# Patient Record
Sex: FEMALE | Race: BLACK OR AFRICAN AMERICAN | NOT HISPANIC OR LATINO | Employment: OTHER | URBAN - METROPOLITAN AREA
[De-identification: names, ages, dates, MRNs, and addresses within clinical notes are randomized per-mention and may not be internally consistent; named-entity substitution may affect disease eponyms.]

---

## 2017-01-04 ENCOUNTER — PATIENT OUTREACH (OUTPATIENT)
Dept: ADMINISTRATIVE | Facility: HOSPITAL | Age: 57
End: 2017-01-04

## 2017-01-04 NOTE — LETTER
January 4, 2017    Osiris Coxony  [address]  [facility] [address]             [facility]  [address]  [place] [address]  Phone: [phone] Dear Mrs. Dumont:    [facility] is committed to your overall health.  To help you get the most out of each of your visits, we will review your information to make sure you are up to date on all of your recommended tests and/or procedures.      Dr. Mckeon has found that you may be due for     One-time Hepatitis C Screening lab test(a viral condition that can harm the liver)  Tetanus vaccine  Pneumonia vaccine  Pap smear  Mammogram  Colonoscopy  Diabetic foot exam  Annual dilated eye exam  Flu vaccine    If you have had any of the above done at another facility, please bring the records or information with you so that your record at [facility] will be complete.    If you are currently taking medication, please bring it with you to your appointment for review.      If you have any questions or concerns, please don't hesitate to call.    Sincerely,    Eren Thao LPN Clinical Care Coordinator  [facility] [address] [facility]  [place] [address]  [phone] (p)   [phone] (f)

## 2017-01-05 DIAGNOSIS — I10 ESSENTIAL HYPERTENSION: ICD-10-CM

## 2017-01-06 RX ORDER — VERAPAMIL HYDROCHLORIDE 240 MG/1
TABLET, FILM COATED, EXTENDED RELEASE ORAL
Qty: 30 TABLET | Refills: 0 | Status: SHIPPED | OUTPATIENT
Start: 2017-01-06

## 2017-01-18 ENCOUNTER — TELEPHONE (OUTPATIENT)
Dept: FAMILY MEDICINE | Facility: CLINIC | Age: 57
End: 2017-01-18

## 2017-01-18 NOTE — TELEPHONE ENCOUNTER
----- Message from Bakari Gonzales sent at 1/18/2017  1:10 PM CST -----  Contact: patient  Patient called stated that she can't make appt that she doesn't have transportation. appt is schedule for 1:20pm today. Thanks,

## 2017-02-08 DIAGNOSIS — E78.2 MIXED HYPERLIPIDEMIA: ICD-10-CM

## 2017-02-08 DIAGNOSIS — F51.04 CHRONIC INSOMNIA: ICD-10-CM

## 2017-02-08 RX ORDER — SIMVASTATIN 10 MG/1
TABLET, FILM COATED ORAL
Qty: 30 TABLET | Refills: 5 | Status: SHIPPED | OUTPATIENT
Start: 2017-02-08 | End: 2017-03-18 | Stop reason: SDUPTHER

## 2017-02-08 RX ORDER — ZOLPIDEM TARTRATE 5 MG/1
TABLET ORAL
Qty: 30 TABLET | Refills: 2 | Status: SHIPPED | OUTPATIENT
Start: 2017-02-08 | End: 2017-05-10 | Stop reason: SDUPTHER

## 2017-03-02 ENCOUNTER — PATIENT OUTREACH (OUTPATIENT)
Dept: ADMINISTRATIVE | Facility: HOSPITAL | Age: 57
End: 2017-03-02

## 2017-03-02 NOTE — LETTER
March 2, 2017    Osiris Dumont  [address]  [facility] [address]             [facility]  [address]  [facility] [address]  Phone: [phone] Dear Mrs. Dumont:    [facility] is committed to your overall health.  To help you get the most out of each of your visits, we will review your information to make sure you are up to date on all of your recommended tests and/or procedures.      Dr. Mckeon has found that you may be due for One time Hepatitis C screening lab test ( a viral condition that harms the liver), tetanus vaccine, pneumonia vaccine, pap smear, mammogram, colonoscopy, diabetic foot exam, annual dilated eye exam, flu vaccine, hemoglobin A1C.     If you have had any of the above done at another facility, please bring the records or information with you so that your record at [facility] will be complete.    If you are currently taking medication, please bring it with you to your appointment for review.        If you have any questions or concerns, please don't hesitate to call.    Sincerely,        Eren Thao LPN Clinical Care Coordinator  [facility] [address] [facility]  [place] [address]  [phone] (p)   [phone] (f)

## 2017-03-17 ENCOUNTER — HOSPITAL ENCOUNTER (OUTPATIENT)
Dept: RADIOLOGY | Facility: HOSPITAL | Age: 57
Discharge: HOME | End: 2017-03-17
Attending: FAMILY MEDICINE
Payer: MEDICAID

## 2017-03-17 ENCOUNTER — OFFICE VISIT (OUTPATIENT)
Dept: FAMILY MEDICINE | Facility: CLINIC | Age: 57
End: 2017-03-17
Payer: MEDICAID

## 2017-03-17 VITALS
SYSTOLIC BLOOD PRESSURE: 136 MMHG | HEIGHT: 64 IN | RESPIRATION RATE: 16 BRPM | BODY MASS INDEX: 41.33 KG/M2 | HEART RATE: 86 BPM | DIASTOLIC BLOOD PRESSURE: 76 MMHG | WEIGHT: 242.06 LBS

## 2017-03-17 DIAGNOSIS — E66.01 MORBID OBESITY, UNSPECIFIED OBESITY TYPE: ICD-10-CM

## 2017-03-17 DIAGNOSIS — E03.9 HYPOTHYROIDISM, UNSPECIFIED TYPE: ICD-10-CM

## 2017-03-17 DIAGNOSIS — E78.2 MIXED HYPERLIPIDEMIA: ICD-10-CM

## 2017-03-17 DIAGNOSIS — F51.04 CHRONIC INSOMNIA: ICD-10-CM

## 2017-03-17 DIAGNOSIS — G89.29 CHRONIC PAIN OF BOTH KNEES: ICD-10-CM

## 2017-03-17 DIAGNOSIS — M25.561 CHRONIC PAIN OF BOTH KNEES: ICD-10-CM

## 2017-03-17 DIAGNOSIS — Z79.4 TYPE 2 DIABETES MELLITUS WITHOUT COMPLICATION, WITH LONG-TERM CURRENT USE OF INSULIN: Primary | ICD-10-CM

## 2017-03-17 DIAGNOSIS — M25.562 CHRONIC PAIN OF BOTH KNEES: ICD-10-CM

## 2017-03-17 DIAGNOSIS — G47.10 HYPERSOMNIA: ICD-10-CM

## 2017-03-17 DIAGNOSIS — E11.9 TYPE 2 DIABETES MELLITUS WITHOUT COMPLICATION, WITH LONG-TERM CURRENT USE OF INSULIN: Primary | ICD-10-CM

## 2017-03-17 DIAGNOSIS — I10 ESSENTIAL HYPERTENSION: ICD-10-CM

## 2017-03-17 PROCEDURE — 99999 PR PBB SHADOW E&M-EST. PATIENT-LVL III: CPT | Mod: PBBFAC,,, | Performed by: FAMILY MEDICINE

## 2017-03-17 PROCEDURE — 99214 OFFICE O/P EST MOD 30 MIN: CPT | Mod: S$PBB,,, | Performed by: FAMILY MEDICINE

## 2017-03-17 PROCEDURE — 73562 X-RAY EXAM OF KNEE 3: CPT | Mod: 26,50,, | Performed by: RADIOLOGY

## 2017-03-17 PROCEDURE — 73562 X-RAY EXAM OF KNEE 3: CPT | Mod: TC,50,PO

## 2017-03-17 RX ORDER — HYDROCODONE BITARTRATE AND ACETAMINOPHEN 10; 325 MG/1; MG/1
1 TABLET ORAL DAILY PRN
Refills: 0 | Status: ON HOLD | COMMUNITY
Start: 2017-02-16 | End: 2017-11-15 | Stop reason: HOSPADM

## 2017-03-17 NOTE — PROGRESS NOTES
Subjective:     THIS DOCUMENT WAS MADE IN PART WITH [employer] DICTATION SOFTWARE. OCCASIONALLY THIS SOFTWARE MAY MISINTERPRET WORDS OR PHRASES.   Patient ID: Osiris Dumont is a 56 y.o. female.    Chief Complaint: Diabetes (3 month follow up ); Leg Pain (left leg ); and Chest Pain    HPI    this is a 56 year old female with mild intellectual disability. She is here with her sister who is her primary caretaker. She's also here with her daughter    T2DM, Chronic condition. Her last A1c was reasonably satisfactory. But she reports that recent readings have been elevated, can often reach 200, AM's 150-160. She denies polyuria or polydipsia. She does have a sedentary lifestyle    HTN,  Chronic condition, this appears stable. No home readings.     Hypothyroidism, she does remain on levothyroxine is that she is compliant with medication. She is due for labs     she does complain of chronic fatigue. In her chart there is a documented history of instructive sleep apnea but she doesn't know where this came from. She certainly has symptoms that are very suspicious. She doesn't remember having a sleep study. Her daughter does state she snores very loudly. Unaware of ethnic spells     just history of chronic insomnia. She states the Ambien really doesn't work that well for her anymore. She goes sleep-related doesn't wake up till two or three in the afternoon but generally sleep straight without waking up when she falls asleep     she does have chronic pain issues, osteoarthritis, as well as back pain. She has a particular hydrocodone that apparently she gets from a doctor near her residence in [facility]. I do not prescribe her narcotics.    Active Ambulatory Problems     Diagnosis Date Noted    Type 2 diabetes mellitus     Hypertension     Hyperlipidemia     Chronic insomnia     Chest pain on exertion 01/07/2013    Dyspnea on effort 01/07/2013    Hypothyroidism     Morbid obesity 03/28/2013    NANCY (obstructive sleep apnea)  "03/28/2013    Chronic bronchitis 08/16/2014    Tardive dyskinesia 08/16/2014     Resolved Ambulatory Problems     Diagnosis Date Noted    No Resolved Ambulatory Problems     Past Medical History:   Diagnosis Date    Allergy     Arthritis     Asthma     Chronic insomnia     Headache(784.0)     Hyperlipidemia     Hypertension     Hypothyroidism     Otitis media     Type 2 diabetes mellitus            Review of Systems   Constitutional: Positive for fatigue. Negative for chills, fever and unexpected weight change.   HENT: Negative for postnasal drip, sinus pressure and trouble swallowing.    Eyes: Negative for photophobia and visual disturbance.   Respiratory: Negative for cough, chest tightness, shortness of breath and wheezing.    Cardiovascular: Negative for chest pain and leg swelling.   Endocrine: Negative for polydipsia, polyphagia and polyuria.   Genitourinary: Negative for dysuria and hematuria.   Musculoskeletal: Positive for arthralgias and back pain.   Skin: Negative for rash and wound.   Neurological: Negative for speech difficulty, weakness, light-headedness and headaches.   Hematological: Negative for adenopathy.   Psychiatric/Behavioral: Positive for sleep disturbance. Negative for agitation, dysphoric mood and suicidal ideas. The patient is nervous/anxious.        Objective:       Vitals:    03/17/17 1342 03/17/17 1357   BP: (!) 148/80 136/76   Pulse: 86    Resp: 16    Weight: 109.8 kg (242 lb 1 oz)    Height: 5' 4" (1.626 m)        Physical Exam   Constitutional: No distress.   Appearance is consistent with calculated BMI:  Body mass index is 41.55 kg/(m^2).     HENT:   Head: Normocephalic and atraumatic.   Eyes: Conjunctivae are normal. Right eye exhibits no discharge. Left eye exhibits no discharge. No scleral icterus.   Cardiovascular: Normal rate and regular rhythm.    No murmur heard.  Pulses:       Dorsalis pedis pulses are 2+ on the right side, and 2+ on the left side.        " Posterior tibial pulses are 2+ on the right side, and 2+ on the left side.   Pulmonary/Chest: Effort normal and breath sounds normal. No respiratory distress. She has no wheezes.   Musculoskeletal:        Right foot: There is no deformity.        Left foot: There is no deformity.   She has mild valgus is. There is no redness or warmth.   Feet:   Right Foot:   Protective Sensation: 10 sites tested. 10 sites sensed.   Skin Integrity: Positive for callus and dry skin. Negative for ulcer.   Left Foot:   Protective Sensation: 10 sites tested. 10 sites sensed.   Skin Integrity: Positive for callus and dry skin. Negative for ulcer.   Skin: She is not diaphoretic.       Knees varus deformituy  Very dry feet  Assessment:       1. Type 2 diabetes mellitus without complication, with long-term current use of insulin    2. Essential hypertension    3. Mixed hyperlipidemia    4. Chronic insomnia    5. Hypothyroidism, unspecified type    6. Morbid obesity, unspecified obesity type    7. Hypersomnia    8. Chronic pain of both knees        Plan:       Osiris was seen today for diabetes, leg pain and chest pain.    Diagnoses and all orders for this visit:    Type 2 diabetes mellitus without complication, with long-term current use of insulin  -     TSH; Future  -     Hemoglobin A1c; Future  -     Microalbumin/creatinine urine ratio; Future    Essential hypertension  -     Comprehensive metabolic panel; Future  -     TSH; Future    Mixed hyperlipidemia  -     Lipid panel; Future    Chronic insomnia   I recommend improving sleep habits. I don't recommend increasing Ambien or adding any other agents at this point she needs to have evaluation for sleep apnea first    Hypothyroidism, unspecified type  -     TSH; Future    Morbid obesity, unspecified obesity type   discussed a healthy diet exercise and weight loss    Hypersomnia   multiple risk factors and symptoms worrisome for sleep apnea. I have given her a written order for sleep  consultation. She will bring this to a clinic near her home.    Chronic pain of both knees  -     X-ray Knee Ortho Bilateral; Future  -     Ambulatory referral to Orthopedics     She should follow every six months sooner if needed

## 2017-03-17 NOTE — MR AVS SNAPSHOT
[facility]  [address]  [place] [address]  Phone: [phone]  Fax: [phone]                  Osiris Dumont   3/17/2017 1:40 PM   Office Visit    Description:  Female : 1960   Provider:  Galen Mckeon MD   Department:  [facility]           Reason for Visit     Diabetes     Leg Pain     Chest Pain           Diagnoses this Visit        Comments    Type 2 diabetes mellitus without complication, with long-term current use of insulin    -  Primary     Essential hypertension         Mixed hyperlipidemia         Chronic insomnia         Hypothyroidism, unspecified type         Morbid obesity, unspecified obesity type         Hypersomnia         Chronic pain of both knees                To Do List           Future Appointments        Provider Department Dept Phone    3/17/2017 2:50 PM LAB, [place] [facility] [phone]    3/17/2017 3:15 PM [facility] XR3 [facility] [phone]    3/20/2017 11:15 AM Ayaz Wilkes MD [facility] [phone]    2017 1:20 PM Galen Mckeon MD [facility] [phone]      Goals (5 Years of Data)     None      [facility] On Call     [facility] Nurse Care Line - 24/7 Assistance  Registered nurses in the [facility] provide clinical advisement, health education, appointment booking, and other advisory services.  Call for this free service at [phone].             Medications           Message regarding Medications     Verify the changes and/or additions to your medication regime listed below are the same as discussed with your clinician today.  If any of these changes or additions are incorrect, please notify your healthcare provider.             Verify that the below list of medications is an accurate representation of the medications you are currently taking.  If none reported, the list may be blank. If incorrect, please  "contact your healthcare provider. Carry this list with you in case of emergency.           Current Medications     ACCU-CHEK SMARTVIEW TEST STRIP Strp TEST 3 TIMES DAILY BEFORE MEALS    furosemide (LASIX) 40 MG tablet Take 1 tablet (40 mg total) by mouth 2 (two) times daily.    hydrocodone-acetaminophen 10-325mg (NORCO)  mg Tab Take 1 tablet by mouth daily as needed.    insulin glargine (LANTUS SOLOSTAR) 100 unit/mL (3 mL) InPn pen Inject 10 Units into the skin once daily.    insulin needles, disposable, (BD INSULIN PEN NEEDLE UF SHORT) 31 X 5/16 " Ndle Use daily with Lantus solostar pen    lancets (ACCU-CHEK FASTCLIX) [facility] USE TO TEST BLOOD SUGAR AS DIRECTED    levothyroxine (SYNTHROID) 25 MCG tablet Take 1 tablet (25 mcg total) by mouth once daily.    metformin (GLUCOPHAGE) 500 MG tablet Take 1 tablet (500 mg total) by mouth 2 (two) times daily with meals.    metoprolol tartrate (LOPRESSOR) 50 MG tablet Take 1 tablet (50 mg total) by mouth once daily.    omeprazole (PRILOSEC) 20 MG capsule Take 1 capsule (20 mg total) by mouth once daily.    PROAIR HFA 90 mcg/actuation inhaler Inhale 2 puffs into the lungs every 6 (six) hours as needed.    risperidone (RISPERDAL M-TABS) 1 MG TbDL Take 1 tablet (1 mg total) by mouth once daily.    sertraline (ZOLOFT) 100 MG tablet Take 1 tablet (100 mg total) by mouth once daily.    simvastatin (ZOCOR) 10 MG tablet Take 1 tablet (10 mg total) by mouth every evening.    sucralfate (CARAFATE) 1 gram tablet Take 1 tablet (1 g total) by mouth 4 (four) times daily before meals and nightly.    verapamil (CALAN-SR) 240 MG CR tablet Take 1 tablet (240 mg total) by mouth every evening.    zolpidem (AMBIEN) 5 MG Tab Take 1 tablet (5 mg total) by mouth nightly.    valsartan (DIOVAN) 80 MG tablet Take 1 tablet (80 mg total) by mouth once daily.           Clinical Reference Information           Your Vitals Were     BP Pulse Resp Height Weight BMI    136/76 86 16 5' 4" (1.626 m) 109.8 kg " (242 lb 1 oz) 41.55 kg/m2      Blood Pressure          Most Recent Value    BP  136/76      Allergies as of 3/17/2017     No Known Allergies      Immunizations Administered on Date of Encounter - 3/17/2017     None      Orders Placed During Today's Visit      Normal Orders This Visit    Ambulatory referral to Orthopedics     Future Labs/Procedures Expected by Expires    Comprehensive metabolic panel  3/17/2017 3/18/2018    Hemoglobin A1c  3/17/2017 3/18/2018    Lipid panel  3/17/2017 3/18/2018    Microalbumin/creatinine urine ratio  3/17/2017 3/18/2018    TSH  3/17/2017 3/18/2018    X-ray Knee Ortho Bilateral  3/17/2017 3/17/2018      Language Assistance Services     ATTENTION: Language assistance services are available, free of charge. Please call [phone].      ATENCIÓN: Si marko avendanomane, tiene a lawson disposición servicios gratuitos de asistencia lingüística. Llame al [phone].     CHÚ Ý: N?u b?n nói Ti?ng Vi?t, có các d?ch v? h? tr? ngôn ng? mi?n phí dành cho b?n. G?i s? [phone].         [facility] complies with applicable Federal civil rights laws and does not discriminate on the basis of race, color, national origin, age, disability, or sex.

## 2017-03-18 ENCOUNTER — TELEPHONE (OUTPATIENT)
Dept: FAMILY MEDICINE | Facility: CLINIC | Age: 57
End: 2017-03-18

## 2017-03-18 DIAGNOSIS — E78.2 MIXED HYPERLIPIDEMIA: ICD-10-CM

## 2017-03-18 RX ORDER — SIMVASTATIN 20 MG/1
20 TABLET, FILM COATED ORAL NIGHTLY
Qty: 30 TABLET | Refills: 11 | Status: SHIPPED | OUTPATIENT
Start: 2017-03-18 | End: 2017-06-13

## 2017-03-18 NOTE — TELEPHONE ENCOUNTER
Please call with lab results. Please speak with her sister to confirm. You can report the results to the patient but  patient has intellectual disability in may not fully understand or remember     diabetes is not that bad. There is room for improvement but all things considered A1c of 7.3 is reasonable. I'd like her to increase exercise and cut back on her calories and carbohydrates.     Cholesterol is higher. I want to increase the dosage of her simvastatin from 10 to 20 mg. I did send in a new prescription     she should follow back in six months as originally planned

## 2017-03-21 ENCOUNTER — TELEPHONE (OUTPATIENT)
Dept: ORTHOPEDICS | Facility: CLINIC | Age: 57
End: 2017-03-21

## 2017-03-21 NOTE — TELEPHONE ENCOUNTER
----- Message from Naif Troy sent at 3/21/2017  3:24 PM CDT -----  Contact: self/home   Pt would like to reschedule her appt that she missed on 3/20.

## 2017-03-23 ENCOUNTER — TELEPHONE (OUTPATIENT)
Dept: FAMILY MEDICINE | Facility: CLINIC | Age: 57
End: 2017-03-23

## 2017-03-23 NOTE — TELEPHONE ENCOUNTER
----- Message from RT Broderick sent at 3/23/2017  4:08 PM CDT -----  Contact: pt    pt , requesting a call back concerning a letter she received in the mail concerning her X-ray, thanks.

## 2017-03-24 ENCOUNTER — TELEPHONE (OUTPATIENT)
Dept: FAMILY MEDICINE | Facility: CLINIC | Age: 57
End: 2017-03-24

## 2017-03-24 NOTE — TELEPHONE ENCOUNTER
----- Message from Anna Giordano sent at 3/24/2017 12:13 PM CDT -----  Abigail Rodriguez is returning nurses call regarding the above patient contact her at [phone].    Thank you

## 2017-03-24 NOTE — TELEPHONE ENCOUNTER
Notified daughter that there is room for improvement in her blood sugar results. Dr dillard recommends increasing exercise cutting back on calories and carbohydrates.  She also understands pt cholesterol is higher and he sent in an increased strength of her  Simvastatin for her to take she is to follow up in 6 months.

## 2017-03-24 NOTE — TELEPHONE ENCOUNTER
Spoke with pt she wanted to make sure she understands to stop simvastatin 10 mg once she gets the 20 mgs at the drugstore she understands

## 2017-03-24 NOTE — TELEPHONE ENCOUNTER
----- Message from Daniel Mahmood sent at 3/24/2017 11:29 AM CDT -----  Contact: Sister Abigail Rodriguez  Called to get information from sister's x-ray. Her sister was yesterday, but did not understand the results. Call back [phone]. Thank you!

## 2017-03-29 ENCOUNTER — TELEPHONE (OUTPATIENT)
Dept: FAMILY MEDICINE | Facility: CLINIC | Age: 57
End: 2017-03-29

## 2017-03-29 NOTE — TELEPHONE ENCOUNTER
I already addressed the labs in a phone note I think the day or the day after they were done.  Please look back    The x-ray shows arthritis changes.  It was my impression she was going to follow with orthopedics.  Another option would be physical therapy.  But she has arthritis in both knees.

## 2017-03-29 NOTE — TELEPHONE ENCOUNTER
----- Message from Anna Giordano sent at 3/28/2017  3:42 PM CDT -----  Patient received xray result and would like a call back with an explaination, patient is also requesting lab results, contact patient at [phone].       Thank you

## 2017-04-06 ENCOUNTER — HOSPITAL ENCOUNTER (OUTPATIENT)
Dept: RADIOLOGY | Facility: HOSPITAL | Age: 57
Discharge: HOME | End: 2017-04-06
Attending: ORTHOPAEDIC SURGERY
Payer: MEDICAID

## 2017-04-06 ENCOUNTER — OFFICE VISIT (OUTPATIENT)
Dept: ORTHOPEDICS | Facility: CLINIC | Age: 57
End: 2017-04-06
Payer: MEDICAID

## 2017-04-06 VITALS — BODY MASS INDEX: 41.32 KG/M2 | HEIGHT: 64 IN | WEIGHT: 242 LBS

## 2017-04-06 DIAGNOSIS — E66.01 MORBID OBESITY, UNSPECIFIED OBESITY TYPE: ICD-10-CM

## 2017-04-06 DIAGNOSIS — M25.562 PAIN IN BOTH KNEES, UNSPECIFIED CHRONICITY: ICD-10-CM

## 2017-04-06 DIAGNOSIS — M25.552 LEFT HIP PAIN: ICD-10-CM

## 2017-04-06 DIAGNOSIS — M25.561 PAIN IN BOTH KNEES, UNSPECIFIED CHRONICITY: ICD-10-CM

## 2017-04-06 DIAGNOSIS — M17.0 PRIMARY OSTEOARTHRITIS OF BOTH KNEES: Primary | ICD-10-CM

## 2017-04-06 PROCEDURE — 73502 X-RAY EXAM HIP UNI 2-3 VIEWS: CPT | Mod: TC,PO,LT

## 2017-04-06 PROCEDURE — 99999 PR PBB SHADOW E&M-EST. PATIENT-LVL II: CPT | Mod: PBBFAC,,, | Performed by: ORTHOPAEDIC SURGERY

## 2017-04-06 PROCEDURE — 99203 OFFICE O/P NEW LOW 30 MIN: CPT | Mod: S$PBB,,, | Performed by: ORTHOPAEDIC SURGERY

## 2017-04-06 PROCEDURE — 73502 X-RAY EXAM HIP UNI 2-3 VIEWS: CPT | Mod: 26,LT,, | Performed by: RADIOLOGY

## 2017-04-06 NOTE — LETTER
April 6, 2017      Galen Mckeon MD  [address] [place] [address]           [facility]  [address] [place] [address]  Phone: [phone]          Patient: Osiris Dumont   MR Number: [id number]   YOB: 1960   Date of Visit: 4/6/2017       Dear Dr. Galen Mckeon:    Thank you for referring Osiris Dumont to me for evaluation. Attached you will find relevant portions of my assessment and plan of care.    If you have questions, please do not hesitate to call me. I look forward to following Osiris Dumont along with you.    Sincerely,    Ayaz Wilkes MD    Enclosure  CC:  No Recipients    If you would like to receive this communication electronically, please contact [email or web address] or [phone] to request more information on [employer] Link access.    For providers and/or their staff who would like to refer a patient to [facility], please contact us through our one-stop-shop provider referral line, [facility] , at [phone].    If you feel you have received this communication in error or would no longer like to receive these types of communications, please e-mail [email or web address]

## 2017-04-06 NOTE — PROGRESS NOTES
Osiris Dumont, seen as a consult from Dr. Mckeon, complaining in her left   worse than right knee pain, up to 10/10 on the pain scale.  It has gotten   progressively worse for about two years' time.  Walks with a limp, painful.    Complaints of falling and giving way.    Exam today shows that knee range of motion is full and painless.  Minimally   tender at the joint line.  Hip range of motion on the left side is limited and   reproduces her symptoms.  She walks with a Trendelenburg-type gait.    X-rays of the knee, they all look pretty good.    ASSESSMENT:  Probable early hip arthrosis.    PLAN:  We will get her hip x-ray, and we will see her back to followup with   result.      PBB/PN  dd: 04/06/2017 14:44:07 (CDT)  td: 04/07/2017 00:59:38 (CDT)  Doc ID   #[id number]  Job ID #[id number]    CC:     Further History  Aching pain  Worse with activity  Relieved with rest  No other associated symptoms  No other radiation    Further Exam  Alert and oriented  Pleasant  Contralateral limb has appropriate range of motion for age and condition  Contralateral limb has appropriate strength for age and condition  Contralateral limb has appropriate stability  for age and condition  No adenopathy  Pulses are appropriate for current condition  Skin is intact        Chief Complaint    Chief Complaint   Patient presents with    Knee Pain     bilat,left primary       HPI  Osiris Dumont is a 57 y.o.  female who presents with       Past Medical History  Past Medical History:   Diagnosis Date    Allergy     Arthritis     Asthma     Chronic insomnia     Headache     Hyperlipidemia     Hypertension     Hypothyroidism     Otitis media     Type 2 diabetes mellitus        Past Surgical History  Past Surgical History:   Procedure Laterality Date    CHOLECYSTECTOMY      FOOT SURGERY      benign tumor    HYSTERECTOMY      not sure if complete       Medications  Current Outpatient Prescriptions   Medication Sig    ACCU-CHEK  "SMARTVIEW TEST STRIP Strp TEST 3 TIMES DAILY BEFORE MEALS    furosemide (LASIX) 40 MG tablet Take 1 tablet (40 mg total) by mouth 2 (two) times daily.    hydrocodone-acetaminophen 10-325mg (NORCO)  mg Tab Take 1 tablet by mouth daily as needed.    insulin glargine (LANTUS SOLOSTAR) 100 unit/mL (3 mL) InPn pen Inject 10 Units into the skin once daily.    insulin needles, disposable, (BD INSULIN PEN NEEDLE UF SHORT) 31 X 5/16 " Ndle Use daily with Lantus solostar pen    lancets (ACCU-CHEK FASTCLIX) [facility] USE TO TEST BLOOD SUGAR AS DIRECTED    levothyroxine (SYNTHROID) 25 MCG tablet Take 1 tablet (25 mcg total) by mouth once daily.    metformin (GLUCOPHAGE) 500 MG tablet Take 1 tablet (500 mg total) by mouth 2 (two) times daily with meals.    metoprolol tartrate (LOPRESSOR) 50 MG tablet Take 1 tablet (50 mg total) by mouth once daily.    omeprazole (PRILOSEC) 20 MG capsule Take 1 capsule (20 mg total) by mouth once daily.    PROAIR HFA 90 mcg/actuation inhaler Inhale 2 puffs into the lungs every 6 (six) hours as needed.    risperidone (RISPERDAL M-TABS) 1 MG TbDL Take 1 tablet (1 mg total) by mouth once daily.    sertraline (ZOLOFT) 100 MG tablet Take 1 tablet (100 mg total) by mouth once daily.    simvastatin (ZOCOR) 20 MG tablet Take 1 tablet (20 mg total) by mouth every evening.    sucralfate (CARAFATE) 1 gram tablet Take 1 tablet (1 g total) by mouth 4 (four) times daily before meals and nightly.    valsartan (DIOVAN) 80 MG tablet Take 1 tablet (80 mg total) by mouth once daily.    verapamil (CALAN-SR) 240 MG CR tablet Take 1 tablet (240 mg total) by mouth every evening.    zolpidem (AMBIEN) 5 MG Tab Take 1 tablet (5 mg total) by mouth nightly.     No current facility-administered medications for this visit.        Allergies  Review of patient's allergies indicates:  No Known Allergies    Family History  Family History   Problem Relation Age of Onset    Diabetes Mother     Stroke Mother     " Stroke      Breast cancer      Stroke Sister     Amblyopia Neg Hx     Blindness Neg Hx     Cancer Neg Hx     Cataracts Neg Hx     Glaucoma Neg Hx     Hypertension Neg Hx     Macular degeneration Neg Hx     Retinal detachment Neg Hx     Strabismus Neg Hx     Thyroid disease Neg Hx        Social History  Social History     Social History    Marital status:      Spouse name: N/A    Number of children: N/A    Years of education: N/A     Occupational History    Not on file.     Social History Main Topics    Smoking status: Never Smoker    Smokeless tobacco: Never Used    Alcohol use No    Drug use: No    Sexual activity: Not on file     Other Topics Concern    Not on file     Social History Narrative               Review of Systems     Constitutional: Negative    HENT: Negative  Eyes: Negative  Respiratory: Negative  Cardiovascular: Negative  Musculoskeletal: HPI  Skin: Negative  Neurological: Negative  Hematological: Negative  Endocrine: Negative                 Physical Exam    There were no vitals filed for this visit.  Body mass index is 41.54 kg/(m^2).  Physical Examination:     General appearance -  well appearing, and in no distress  Mental status - awake  Neck - supple  Chest -  symmetric air entry  Heart - normal rate   Abdomen - soft      Assessment     1. Primary osteoarthritis of both knees    2. Pain in both knees, unspecified chronicity    3. Morbid obesity, unspecified obesity type    4. Left hip pain          Plan

## 2017-04-10 RX ORDER — METFORMIN HYDROCHLORIDE 500 MG/1
TABLET ORAL
Qty: 60 TABLET | OUTPATIENT
Start: 2017-04-10

## 2017-04-19 ENCOUNTER — TELEPHONE (OUTPATIENT)
Dept: FAMILY MEDICINE | Facility: CLINIC | Age: 57
End: 2017-04-19

## 2017-04-19 RX ORDER — TRAMADOL HYDROCHLORIDE 50 MG/1
50 TABLET ORAL EVERY 6 HOURS PRN
Qty: 60 TABLET | Refills: 1 | Status: ON HOLD | OUTPATIENT
Start: 2017-04-19 | End: 2017-11-15 | Stop reason: HOSPADM

## 2017-04-19 NOTE — TELEPHONE ENCOUNTER
Informed pt of Dr Mckeon message regarding narcotic. Pt verbalized understanding and is ok with rx for tramadol.

## 2017-04-19 NOTE — TELEPHONE ENCOUNTER
----- Message from Bakari Gonzales sent at 4/19/2017 11:12 AM CDT -----  Contact: Patient  Patient called to advise that she took a hip xray, and she need a hip replacement. Patient requesting script for Lortabs. Send to [facility] pharmacy [place], [place]. Please call back at [phone]. Thanks,

## 2017-04-19 NOTE — TELEPHONE ENCOUNTER
I do not prescribe narcotics for chronic arthritis pain.  She had been on Lortab before but was getting this from a different physician, somebody near her home.  I also don't think that orthopedics here we'll prescribe Lortab.    I am willing to prescribe something called tramadol if she likes but I don't prescribe chronic narcotics.    Let me know if she wants the tramadol.

## 2017-04-19 NOTE — TELEPHONE ENCOUNTER
This patient called today, was seen by Dr Wilkes for hip pain, x ray shows the need for hip replacement. Patient is requesting Rx for pain medication for hip pain. Should this request be sent to Dr Wilkes or are you able to fill for patient? Thank you.

## 2017-04-19 NOTE — TELEPHONE ENCOUNTER
----- Message from Julien STINSON Kimber sent at 4/19/2017  1:37 PM CDT -----  Contact: same  Patient called in and stated that she was checking on her pain medication refill but then said someone call her and the Rx they called in she cannot take and it has to be hydrocodone.      [facility] - [address] [address]  Phone: [phone] Fax: [phone]    Patient call back number is [phone]

## 2017-04-20 ENCOUNTER — TELEPHONE (OUTPATIENT)
Dept: FAMILY MEDICINE | Facility: CLINIC | Age: 57
End: 2017-04-20

## 2017-04-20 RX ORDER — TRAZODONE HYDROCHLORIDE 50 MG/1
TABLET ORAL
Qty: 30 TABLET | Refills: 3 | Status: SHIPPED | OUTPATIENT
Start: 2017-04-20 | End: 2017-10-19

## 2017-04-20 NOTE — TELEPHONE ENCOUNTER
----- Message from Jesika Feldman sent at 4/19/2017  3:50 PM CDT -----  Contact: Patient  Patient needs refill on Trazodone sent [facility]. Any questions call patient [phone].

## 2017-04-20 NOTE — TELEPHONE ENCOUNTER
----- Message from Sarah Garnica sent at 4/20/2017  9:15 AM CDT -----  Contact: Patience  Patient states that she is returning the call about two prescriptions.  One for pain and one for sleeping.  Can you please call the patient back at  [phone].  Thank you

## 2017-04-21 DIAGNOSIS — M25.552 LEFT HIP PAIN: Primary | ICD-10-CM

## 2017-04-24 ENCOUNTER — HOSPITAL ENCOUNTER (OUTPATIENT)
Dept: RADIOLOGY | Facility: HOSPITAL | Age: 57
Discharge: HOME | End: 2017-04-24
Attending: ORTHOPAEDIC SURGERY
Payer: MEDICAID

## 2017-04-24 ENCOUNTER — OFFICE VISIT (OUTPATIENT)
Dept: ORTHOPEDICS | Facility: CLINIC | Age: 57
End: 2017-04-24
Payer: MEDICAID

## 2017-04-24 VITALS — WEIGHT: 242 LBS | BODY MASS INDEX: 41.32 KG/M2 | HEIGHT: 64 IN

## 2017-04-24 DIAGNOSIS — M25.552 LEFT HIP PAIN: ICD-10-CM

## 2017-04-24 DIAGNOSIS — M87.00 AVN (AVASCULAR NECROSIS OF BONE): ICD-10-CM

## 2017-04-24 DIAGNOSIS — M25.552 LEFT HIP PAIN: Primary | ICD-10-CM

## 2017-04-24 PROCEDURE — 99999 PR PBB SHADOW E&M-EST. PATIENT-LVL II: CPT | Mod: PBBFAC,,, | Performed by: ORTHOPAEDIC SURGERY

## 2017-04-24 PROCEDURE — 99213 OFFICE O/P EST LOW 20 MIN: CPT | Mod: S$PBB,,, | Performed by: ORTHOPAEDIC SURGERY

## 2017-04-24 PROCEDURE — 99212 OFFICE O/P EST SF 10 MIN: CPT | Mod: PBBFAC,PO | Performed by: ORTHOPAEDIC SURGERY

## 2017-04-24 NOTE — PROGRESS NOTES
Osiris Dumont, 57 years old, followup.  Hip x-rays revealed her pathology is   indeed coming from her hip.  At this point, her medical comorbidities and her   size I do not feel confident in proceeding with the hip arthroplasty.  We will   try conservative approach with weight loss and exercises.  We can check her back   after several months for this to see if she has improved as a surgical   candidate.      SAURABH/MICHOACANO  dd: 04/24/2017 11:32:34 (CDT)  td: 04/24/2017 21:06:18 (CDT)  Doc ID   #[id number]  Job ID #[id number]    CC:

## 2017-05-08 PROBLEM — M16.12 PRIMARY OSTEOARTHRITIS OF LEFT HIP: Status: ACTIVE | Noted: 2017-05-08

## 2017-05-10 DIAGNOSIS — F51.04 CHRONIC INSOMNIA: ICD-10-CM

## 2017-05-10 RX ORDER — ZOLPIDEM TARTRATE 5 MG/1
TABLET ORAL
Qty: 30 TABLET | Refills: 2 | Status: SHIPPED | OUTPATIENT
Start: 2017-05-10 | End: 2018-11-23

## 2017-05-17 ENCOUNTER — PATIENT OUTREACH (OUTPATIENT)
Dept: ADMINISTRATIVE | Facility: HOSPITAL | Age: 57
End: 2017-05-17

## 2017-05-17 NOTE — PROGRESS NOTES
Overdue eye exam report, due your annual diabetic eye exam, colon cancer screening, mammogram, hepatitis   C screening, and possibly pneumonia and tetanus immunizations

## 2017-05-17 NOTE — LETTER
May 17, 2017    Osiris Dumont  [address]  [facility] [address]             [facility]  [address]  [facility] [address]  Phone: [phone] Dear Mrs. Dumont:    [facility] is committed to your overall health.  To help you get the most out of each of your visits, we will review your information to make sure you are up to date on all of your recommended tests and/or procedures.      We have Dr. Galen Mckeon listed as your primary care provider.  He has found that you may be due for your annual diabetic eye exam, colon cancer screening, mammogram, hepatitis C screening, and possibly pneumonia and tetanus immunizations.     If you have had any of the above done at an outside facility, please let us know so I can update your record.  If you have a copy of these records, please provide a copy for us to scan into your chart.  If not, please provide that provider/facilities contact information so that we may obtain copies from that facility.     Otherwise, please schedule these appointments at your earliest convenience.  You are due for your diabetic follow up with Dr. Mckeon in September of 2017.    If you have any questions or concerns, please don't hesitate to call.    Thank you for letting us care for you,  Marce Sandoval LPN Clinical Care Coordinator  [facility] [place]  [phone]

## 2017-07-07 DIAGNOSIS — F51.04 CHRONIC INSOMNIA: ICD-10-CM

## 2017-07-09 RX ORDER — ZOLPIDEM TARTRATE 5 MG/1
TABLET ORAL
Qty: 30 TABLET | OUTPATIENT
Start: 2017-07-09

## 2017-08-18 DIAGNOSIS — Z12.11 COLON CANCER SCREENING: ICD-10-CM

## 2017-09-05 ENCOUNTER — PATIENT OUTREACH (OUTPATIENT)
Dept: ADMINISTRATIVE | Facility: HOSPITAL | Age: 57
End: 2017-09-05

## 2017-09-05 NOTE — LETTER
September 5, 2017    Osiris Dumont  [address]  [facility] [address]             [facility]  [address]  [facility] [address]  Phone: [phone] Dear Mrs. Dumont:    [facility] committed to your overall health.  To help you get the most out of each of your visits, we will review your information to make sure you are up to date on all of your recommended tests and/or procedures.      Dr. Galen Mckeon has found that you may be due for a urine test for your kidneys, your annual diabetic eye exam, hepatitis C screening, mammogram, colon cancer screening, and possibly tetanus and pneumonia immunziations.     Medicare does not cover all immunizations to be given in the clinic.  Check your benefits to ensure that you do not need to receive your immunizations at the pharmacy.    If you have had any of the above done at another facility, please bring the records or information with you so that your record at [facility] will be complete.  If you would like to schedule any of these, please contact me.    If you are currently taking medication, please bring it with you to your appointment for review.    If you have any questions or concerns, please don't hesitate to call.    Thank you for letting us care for you,  Marce Sandoval LPN Clinical Care Coordinator  [facility] [place] [place]  [phone]

## 2017-09-06 RX ORDER — TRAZODONE HYDROCHLORIDE 50 MG/1
TABLET ORAL
Qty: 30 TABLET | OUTPATIENT
Start: 2017-09-06

## 2017-09-29 DIAGNOSIS — E11.9 TYPE 2 DIABETES MELLITUS WITHOUT COMPLICATION: ICD-10-CM

## 2017-12-12 ENCOUNTER — PATIENT OUTREACH (OUTPATIENT)
Dept: ADMINISTRATIVE | Facility: HOSPITAL | Age: 57
End: 2017-12-12

## 2017-12-12 DIAGNOSIS — E11.00 TYPE 2 DIABETES MELLITUS WITH HYPEROSMOLARITY WITHOUT COMA, UNSPECIFIED LONG TERM INSULIN USE STATUS: Primary | ICD-10-CM

## 2017-12-12 NOTE — PROGRESS NOTES
Pre-visit Health Maintenance Review    Patient due for diabetic eye exam.  Called to schedule retinal camera appointment in [place] if not already done outside of [facility].    Upcoming Primary Care visit: Mike 12/27/17 9:40

## 2017-12-12 NOTE — LETTER
December 12, 2017    Osiris Dumont  [address]  [place] [address]             [facility]  [address]  [facility] [address]  Phone: [phone] Dear Mrs. Dumont:    [facility] is committed to your overall health.  To help you get the most out of each of your visits, we will review your information to make sure you are up to date on all of your recommended tests and/or procedures.      Dr. Galen Mckeon has found that your chart shows you may be due for a urine test for your kidneys, your annual diabetic eye exam, hepatitis C screening, mammogram, colon cancer screening, and possibly tetanus and pneumonia immunizations.     Medicare does not cover all immunizations to be given in the clinic.  Check your benefits to ensure that you do not need to receive your immunizations at the pharmacy.    If you have not had an eye exam in the past year, we now have a  Retinal Camera at the [facility].  If we do this exam the same day you see a member of your Primary Care team, we can save you from having to pay a second co pay.      If you have had any of the above done at another facility, please bring the records or information with you so that your record at [facility] will be complete.  If you would like to schedule any of these, please contact me.    If you are currently taking medication, please bring it with you to your appointment for review.    If you have any questions or concerns, please don't hesitate to call.    Thank you for letting us care for you,  Marce Sandoval LPN Clinical Care Coordinator  [facility] [place] [facility] [place]  [phone]

## 2018-02-26 ENCOUNTER — TELEPHONE (OUTPATIENT)
Dept: FAMILY MEDICINE | Facility: CLINIC | Age: 58
End: 2018-02-26

## 2018-02-26 NOTE — TELEPHONE ENCOUNTER
Advised pt that there was nothing open sooner than her schd appt date. Pt verbalized understanding then stated that the appt time and date wont work.pt wants to be seen by provider only. Please look at [facility] and advise.

## 2018-02-26 NOTE — TELEPHONE ENCOUNTER
----- Message from Ruth Ann Stevens sent at 2/26/2018  4:57 PM CST -----  Contact: self  Patient needs to reschedule appt to sooner date. Epic not allowing me to see any other dates. Call back [phone]

## 2018-02-26 NOTE — TELEPHONE ENCOUNTER
----- Message from Daniel Mahmood sent at 2/26/2018  3:22 PM CST -----  Contact: Osiris  Calling to see if she can be seen before her scheduled appointment. She had a hip surgery and she has been falling. She only wants to see Dr. Mckeon. Please call [phone] (home)   thanks

## 2018-02-27 NOTE — TELEPHONE ENCOUNTER
I'm not sure what she's coming in for but there was a 3 o'clock open on Wednesday. I have temporarily held the spot if she can make it okay to put her there. If not let me know and I'll open it back up

## 2018-03-21 ENCOUNTER — PATIENT OUTREACH (OUTPATIENT)
Dept: ADMINISTRATIVE | Facility: HOSPITAL | Age: 58
End: 2018-03-21

## 2018-03-21 NOTE — LETTER
March 21, 2018    Osiris Dumont  [address]  [facility] [address]             [facility]  [address]  [facility] [address]  Phone: [phone] Dear Mrs. Dumont:    [facility] to your overall [facility].  To help you get the most out of each of your visits, we will review your information to make sure you are up to date on all of your recommended tests and/or procedures.      We have Dr. Galen Mckeon listed as your primary care provider.  You have not seen him in the office since March of 2017.  If Dr. Mckeon is no longer your primary care provider, please contact me so that we may update our records accordingly.      He has found that your chart shows you may be due for an office visit with him, your fasting cholesterol labs, your annual diabetic eye and foot exams, a urine test for your kidneys, hepatitis C screening, mammogram, colon cancer screening, and possibly a tetanus immunization.     If you have not had an eye exam in the past year, we now have a  Retinal Camera at the [facility].  If we do this exam the same day you see a member of your Primary Care team, we can save you from having to pay a second co pay.      Medicare does not cover all immunizations to be given in the clinic.  Check your benefits to ensure that you do not need to receive your immunizations at the pharmacy.    If you have had any of the above done at an outside facility, please let us know so I can update your record.  If you have a copy of these records, please provide a copy for us to scan into your chart.  If not, please provide that provider/facilities contact information so that we may obtain copies from that facility.     Otherwise, please schedule these appointments at your earliest convenience.    If you have any questions or concerns, please don't hesitate to call.    Thank you for letting us care for you,  Marce Sandoval LPN Clinical Care Coordinator  [facility]  [place] and [place]  [phone]

## 2018-03-21 NOTE — PROGRESS NOTES
DM registry, over 1 year.  Called pt to inform:    Due for an office visit with him, your fasting cholesterol labs, your annual diabetic eye and foot exams, a urine test for your kidneys, hepatitis C screening, mammogram, colon cancer screening, and possibly a tetanus immunization

## 2018-05-08 ENCOUNTER — TELEPHONE (OUTPATIENT)
Dept: FAMILY MEDICINE | Facility: CLINIC | Age: 58
End: 2018-05-08

## 2018-05-08 NOTE — TELEPHONE ENCOUNTER
"----- Message from Ally Kendall sent at 5/8/2018 11:21 AM CDT -----  Contact: self  Patient [phone] is calling to schedule an appt with Dr Galen Mckeon only/she does not want to see anyone else/she has hemorrhoids and wants a physical/her last visit with Dr Mckeon was 03 17 17 and she has also missed several appts/I do not show any available appts with Dr Mckeon for this year/I did try to advise patient of this and also tried to schedule with a nurse practitioner and she said "No - I want to see Dr Mckeon/please advise  "

## 2018-05-11 ENCOUNTER — OFFICE VISIT (OUTPATIENT)
Dept: FAMILY MEDICINE | Facility: CLINIC | Age: 58
End: 2018-05-11
Payer: MEDICAID

## 2018-05-11 ENCOUNTER — LAB VISIT (OUTPATIENT)
Dept: LAB | Facility: HOSPITAL | Age: 58
End: 2018-05-11
Attending: FAMILY MEDICINE
Payer: MEDICAID

## 2018-05-11 VITALS
BODY MASS INDEX: 40.07 KG/M2 | SYSTOLIC BLOOD PRESSURE: 142 MMHG | WEIGHT: 234.69 LBS | HEART RATE: 78 BPM | HEIGHT: 64 IN | OXYGEN SATURATION: 92 % | DIASTOLIC BLOOD PRESSURE: 84 MMHG | TEMPERATURE: 99 F

## 2018-05-11 DIAGNOSIS — Z79.4 TYPE 2 DIABETES MELLITUS WITHOUT COMPLICATION, WITH LONG-TERM CURRENT USE OF INSULIN: ICD-10-CM

## 2018-05-11 DIAGNOSIS — K92.2 GASTROINTESTINAL HEMORRHAGE, UNSPECIFIED GASTROINTESTINAL HEMORRHAGE TYPE: Primary | ICD-10-CM

## 2018-05-11 DIAGNOSIS — E78.2 MIXED HYPERLIPIDEMIA: ICD-10-CM

## 2018-05-11 DIAGNOSIS — E03.9 HYPOTHYROIDISM, UNSPECIFIED TYPE: ICD-10-CM

## 2018-05-11 DIAGNOSIS — I10 ESSENTIAL HYPERTENSION: ICD-10-CM

## 2018-05-11 DIAGNOSIS — E11.9 TYPE 2 DIABETES MELLITUS WITHOUT COMPLICATION, WITH LONG-TERM CURRENT USE OF INSULIN: ICD-10-CM

## 2018-05-11 DIAGNOSIS — K92.2 GASTROINTESTINAL HEMORRHAGE, UNSPECIFIED GASTROINTESTINAL HEMORRHAGE TYPE: ICD-10-CM

## 2018-05-11 LAB
ALBUMIN SERPL BCP-MCNC: 4.2 G/DL
ALP SERPL-CCNC: 164 U/L
ALT SERPL W/O P-5'-P-CCNC: 21 U/L
ANION GAP SERPL CALC-SCNC: 12 MMOL/L
AST SERPL-CCNC: 14 U/L
BASOPHILS # BLD AUTO: 0.08 K/UL
BASOPHILS NFR BLD: 0.8 %
BILIRUB SERPL-MCNC: 0.3 MG/DL
BUN SERPL-MCNC: 13 MG/DL
CALCIUM SERPL-MCNC: 9.8 MG/DL
CHLORIDE SERPL-SCNC: 103 MMOL/L
CHOLEST SERPL-MCNC: 192 MG/DL
CHOLEST/HDLC SERPL: 4.7 {RATIO}
CO2 SERPL-SCNC: 26 MMOL/L
CREAT SERPL-MCNC: 1 MG/DL
CREAT UR-MCNC: 322 MG/DL
DIFFERENTIAL METHOD: ABNORMAL
EOSINOPHIL # BLD AUTO: 0.4 K/UL
EOSINOPHIL NFR BLD: 3.9 %
ERYTHROCYTE [DISTWIDTH] IN BLOOD BY AUTOMATED COUNT: 15.9 %
EST. GFR  (AFRICAN AMERICAN): >60 ML/MIN/1.73 M^2
EST. GFR  (NON AFRICAN AMERICAN): >60 ML/MIN/1.73 M^2
ESTIMATED AVG GLUCOSE: 160 MG/DL
FERRITIN SERPL-MCNC: 88 NG/ML
GLUCOSE SERPL-MCNC: 153 MG/DL
HBA1C MFR BLD HPLC: 7.2 %
HCT VFR BLD AUTO: 38.4 %
HDLC SERPL-MCNC: 41 MG/DL
HDLC SERPL: 21.4 %
HGB BLD-MCNC: 12.1 G/DL
IMM GRANULOCYTES # BLD AUTO: 0.07 K/UL
IMM GRANULOCYTES NFR BLD AUTO: 0.7 %
IRON SERPL-MCNC: 43 UG/DL
LDLC SERPL CALC-MCNC: 119.2 MG/DL
LYMPHOCYTES # BLD AUTO: 2.1 K/UL
LYMPHOCYTES NFR BLD: 22.3 %
MCH RBC QN AUTO: 28 PG
MCHC RBC AUTO-ENTMCNC: 31.5 G/DL
MCV RBC AUTO: 89 FL
MICROALBUMIN UR DL<=1MG/L-MCNC: 48 UG/ML
MICROALBUMIN/CREATININE RATIO: 14.9 UG/MG
MONOCYTES # BLD AUTO: 0.7 K/UL
MONOCYTES NFR BLD: 6.9 %
NEUTROPHILS # BLD AUTO: 6.2 K/UL
NEUTROPHILS NFR BLD: 65.4 %
NONHDLC SERPL-MCNC: 151 MG/DL
NRBC BLD-RTO: 0 /100 WBC
PLATELET # BLD AUTO: 398 K/UL
PMV BLD AUTO: 9.8 FL
POTASSIUM SERPL-SCNC: 4 MMOL/L
PROT SERPL-MCNC: 8.6 G/DL
RBC # BLD AUTO: 4.32 M/UL
SATURATED IRON: 9 %
SODIUM SERPL-SCNC: 141 MMOL/L
TOTAL IRON BINDING CAPACITY: 469 UG/DL
TRANSFERRIN SERPL-MCNC: 317 MG/DL
TRIGL SERPL-MCNC: 159 MG/DL
TSH SERPL DL<=0.005 MIU/L-ACNC: 0.97 UIU/ML
WBC # BLD AUTO: 9.52 K/UL

## 2018-05-11 PROCEDURE — 83540 ASSAY OF IRON: CPT

## 2018-05-11 PROCEDURE — 99213 OFFICE O/P EST LOW 20 MIN: CPT | Mod: PBBFAC,PN | Performed by: FAMILY MEDICINE

## 2018-05-11 PROCEDURE — 83036 HEMOGLOBIN GLYCOSYLATED A1C: CPT

## 2018-05-11 PROCEDURE — 80053 COMPREHEN METABOLIC PANEL: CPT

## 2018-05-11 PROCEDURE — 99999 PR PBB SHADOW E&M-EST. PATIENT-LVL III: CPT | Mod: PBBFAC,,, | Performed by: FAMILY MEDICINE

## 2018-05-11 PROCEDURE — 99215 OFFICE O/P EST HI 40 MIN: CPT | Mod: S$PBB,,, | Performed by: FAMILY MEDICINE

## 2018-05-11 PROCEDURE — 82728 ASSAY OF FERRITIN: CPT

## 2018-05-11 PROCEDURE — 36415 COLL VENOUS BLD VENIPUNCTURE: CPT | Mod: PN

## 2018-05-11 PROCEDURE — 85025 COMPLETE CBC W/AUTO DIFF WBC: CPT

## 2018-05-11 PROCEDURE — 82043 UR ALBUMIN QUANTITATIVE: CPT

## 2018-05-11 PROCEDURE — 80061 LIPID PANEL: CPT

## 2018-05-11 PROCEDURE — 84443 ASSAY THYROID STIM HORMONE: CPT

## 2018-05-11 RX ORDER — HYDROCODONE BITARTRATE AND ACETAMINOPHEN 10; 325 MG/1; MG/1
1 TABLET ORAL DAILY
Refills: 0 | COMMUNITY
Start: 2018-05-07 | End: 2018-06-25 | Stop reason: SDUPTHER

## 2018-05-11 RX ORDER — CLOPIDOGREL BISULFATE 75 MG/1
75 TABLET ORAL DAILY
Refills: 3 | COMMUNITY
Start: 2018-04-19 | End: 2018-11-23

## 2018-05-11 NOTE — PROGRESS NOTES
Subjective:     THIS DOCUMENT WAS MADE IN PART WITH [employer] DICTATION SOFTWARE.  OCCASIONALLY THIS SOFTWARE WILL MISINTERPRET WORDS OR PHRASES.     Patient ID: Osiris Dumont is a 58 y.o. female.    Chief Complaint: Annual Exam (pt states she has been having rectal bleeding x 2 months)    HPI   GI bleeding, with BM  Always some, sometimes more and b/n movements  Denies constipation, but she was advised to take a take a stool softener, which she does sometimes   no abdominal pain but sometimes cramping and bloating. No nausea or vomiting. No unexplained weight loss.     Type II diabetes, chronic condition, not ideal, mildly elevated A1c at two. No hypoglycemia. Not checking regularly at home. Diet certainly has room for improvement but overall she has done reasonably well all things considered     hypertension, mild worsening today. It appears that systolic readings have been mildly elevated no headaches no shortness of breath and chest pains. Mild ankle edema.     Dyslipidemia, has not been ideally controlled though has been relatively stable    Active Ambulatory Problems     Diagnosis Date Noted    Type 2 diabetes mellitus     Hypertension     Hyperlipidemia     Chronic insomnia     Chest pain on exertion 01/07/2013    Dyspnea on effort 01/07/2013    Hypothyroidism     Morbid obesity 03/28/2013    NANCY (obstructive sleep apnea) 03/28/2013    Chronic bronchitis 08/16/2014    Tardive dyskinesia 08/16/2014    Primary osteoarthritis of left hip 05/08/2017     Resolved Ambulatory Problems     Diagnosis Date Noted    No Resolved Ambulatory Problems     Past Medical History:   Diagnosis Date    Allergy     Arthritis     Asthma     Chronic insomnia     Depression     GERD (gastroesophageal reflux disease)     Headache(784.0)     Hyperlipidemia     Hypertension     Hypothyroidism     Otitis media     Type 2 diabetes mellitus      Current Outpatient Prescriptions on File Prior to Visit   Medication Sig  "Dispense Refill    ACCU-CHEK SMARTVIEW TEST STRIP Strp TEST 3 TIMES DAILY BEFORE MEALS 100 each 1    furosemide (LASIX) 40 MG tablet Take 1 tablet (40 mg total) by mouth 2 (two) times daily. 60 tablet 5    hydrocodone-acetaminophen 5-325mg (NORCO) 5-325 mg per tablet Take 1 tablet by mouth every 12 (twelve) hours as needed for Pain. 30 tablet 0    insulin glargine (LANTUS SOLOSTAR) 100 unit/mL (3 mL) InPn pen Inject 10 Units into the skin once daily. (Patient taking differently: Inject 10 Units into the skin every evening. Inject 10 Units into the skin once daily.) 15 mL 5    insulin needles, disposable, (BD INSULIN PEN NEEDLE UF SHORT) 31 X 5/16 " Ndle Use daily with Lantus solostar pen 100 each 5    lancets (ACCU-CHEK FASTCLIX) [facility] USE TO TEST BLOOD SUGAR AS DIRECTED 100 each 6    levothyroxine (SYNTHROID) 25 MCG tablet Take 1 tablet (25 mcg total) by mouth once daily. 30 tablet 3    metformin (GLUCOPHAGE) 500 MG tablet Take 1 tablet (500 mg total) by mouth 2 (two) times daily with meals. 60 tablet 5    metoprolol tartrate (LOPRESSOR) 50 MG tablet Take 1 tablet (50 mg total) by mouth once daily. 30 tablet 3    naproxen (NAPROSYN) 500 MG tablet Take 500 mg by mouth 2 (two) times daily as needed.  3    omeprazole (PRILOSEC) 20 MG capsule Take 1 capsule (20 mg total) by mouth once daily. 30 capsule 5    potassium chloride SA (K-DUR,KLOR-CON) 20 MEQ tablet Take 2 tablets (40 mEq total) by mouth once daily. 30 tablet 0    PROAIR HFA 90 mcg/actuation inhaler Inhale 2 puffs into the lungs every 6 (six) hours as needed. 8.5 g 3    sertraline (ZOLOFT) 100 MG tablet Take 1 tablet (100 mg total) by mouth once daily. 30 tablet 3    simvastatin (ZOCOR) 10 MG tablet Take 10 mg by mouth every evening.  5    verapamil (CALAN-SR) 240 MG CR tablet Take 1 tablet (240 mg total) by mouth every evening. 30 tablet 0    zolpidem (AMBIEN) 5 MG Tab Take 1 tablet (5 mg total) by mouth nightly. 30 tablet 2    liraglutide " 0.6 mg/0.1 mL, 18 mg/3 mL, subq PNIJ (VICTOZA 2-ZUNILDA) 0.6 mg/0.1 mL (18 mg/3 mL) PnIj Inject 1.2 mg into the skin once daily.       No current facility-administered medications on file prior to visit.      Review of patient's allergies indicates:   Allergen Reactions    Aspirin Shortness Of Breath    Iodine Rash     Social History   Substance Use Topics    Smoking status: Never Smoker    Smokeless tobacco: Never Used    Alcohol use No         Review of Systems   Constitutional: Positive for fatigue. Negative for chills, fever and unexpected weight change.   HENT: Negative for postnasal drip, sinus pressure and trouble swallowing.    Eyes: Negative for visual disturbance.   Respiratory: Negative for cough, chest tightness, shortness of breath and wheezing.    Cardiovascular: Negative for chest pain and leg swelling.   Gastrointestinal: Positive for blood in stool. Negative for constipation, diarrhea, nausea and vomiting.   Endocrine: Negative for polydipsia, polyphagia and polyuria.   Genitourinary: Negative for dysuria and hematuria.   Musculoskeletal: Positive for arthralgias, back pain and gait problem.   Skin: Negative for rash and wound.   Neurological: Negative for speech difficulty, weakness, light-headedness and headaches.   Hematological: Negative for adenopathy.   Psychiatric/Behavioral: Positive for sleep disturbance. Negative for dysphoric mood and suicidal ideas. The patient is nervous/anxious.        Objective:      Physical Exam   Constitutional: She is oriented to person, place, and time. No distress.   Appearance is consistent with calculated Body mass index is 40.29 kg/m².       HENT:   Head: Normocephalic and atraumatic.   Right Ear: External ear normal.   Left Ear: External ear normal.   Eyes: Conjunctivae are normal. Right eye exhibits no discharge. Left eye exhibits no discharge. No scleral icterus.   Neck: Neck supple. No JVD present. No tracheal deviation present.   Cardiovascular: Normal  rate and regular rhythm.  Exam reveals no gallop and no friction rub.    No murmur heard.  Pulses:       Dorsalis pedis pulses are 2+ on the right side, and 2+ on the left side.        Posterior tibial pulses are 2+ on the right side, and 2+ on the left side.   Pulmonary/Chest: Effort normal and breath sounds normal. No respiratory distress. She has no wheezes.   Abdominal: Soft. Bowel sounds are normal. She exhibits no distension and no mass. There is no tenderness. There is no rebound and no guarding. No hernia.   Musculoskeletal:        Right foot: There is no deformity.        Left foot: There is no deformity.   She has mild valgus is. There is no redness or warmth.   Feet:   Right Foot:   Protective Sensation: 10 sites tested. 10 sites sensed.   Skin Integrity: Positive for callus and dry skin. Negative for ulcer.   Left Foot:   Protective Sensation: 10 sites tested. 10 sites sensed.   Skin Integrity: Positive for callus and dry skin. Negative for ulcer.   Lymphadenopathy:     She has no cervical adenopathy.   Neurological: She is alert and oriented to person, place, and time. She displays normal reflexes. No cranial nerve deficit or sensory deficit. She exhibits normal muscle tone.   Skin: Skin is dry. She is not diaphoretic. No pallor.      Reviewed recent x-rays including venous ultrasound from late 2017, swelling but no DVT noted  Assessment:       1. Gastrointestinal hemorrhage, unspecified gastrointestinal hemorrhage type    2. Type 2 diabetes mellitus without complication, with long-term current use of insulin    3. Mixed hyperlipidemia    4. Essential hypertension    5. Hypothyroidism, unspecified type        Plan:       Osiris was seen today for annual exam.    Diagnoses and all orders for this visit:    Gastrointestinal hemorrhage, unspecified gastrointestinal hemorrhage type  -     Iron and TIBC; Future  -     Ferritin; Future  -     CBC auto differential; Future   colonoscopy requested ASAP. Hold  Plavix for now. Resume stool softener. If she has any heavy bleeding between now and colonoscopy she must go to the emergency room. If  Severe anemia then will refer to the emergency room.   she does not appear toxic, so cautiously work up quickly as an outpatient    Type 2 diabetes mellitus without complication, with long-term current use of insulin  -     Hemoglobin A1c; Future  -     Microalbumin/creatinine urine ratio; Future  -     Microalbumin/creatinine urine ratio    Mixed hyperlipidemia  -     Lipid panel; Future    Essential hypertension  -     Comprehensive metabolic panel; Future   Mildly elevated systolic given current urgent situation with G.I. Bleeding no medication adjustments, until reevaluation and stabilized    Hypothyroidism, unspecified type  -     TSH; Future           Hold plavix until GI bleeding evaluated and resolved.

## 2018-05-15 ENCOUNTER — TELEPHONE (OUTPATIENT)
Dept: FAMILY MEDICINE | Facility: CLINIC | Age: 58
End: 2018-05-15

## 2018-05-15 DIAGNOSIS — D50.9 IRON DEFICIENCY ANEMIA, UNSPECIFIED IRON DEFICIENCY ANEMIA TYPE: ICD-10-CM

## 2018-05-15 DIAGNOSIS — Z12.11 COLON CANCER SCREENING: Primary | ICD-10-CM

## 2018-05-15 NOTE — TELEPHONE ENCOUNTER
----- Message from Yasemin Moore sent at 5/15/2018  2:16 PM CDT -----  Type:  Patient Requesting Referral    Who Called:  Osiris  Does the patient already have the specialty appointment scheduled?:  No  If yes, what is the date of that appointment?: n/a  Referral to What Specialty:  Gastro  Reason for Referral:  Colonoscopy  Does the patient want the referral with a specific physician?:  tori  [facility]  Patient Requesting a Call Back?: yes  Best Call Back Number: [phone]  Additional Information:   n/a

## 2018-06-04 ENCOUNTER — TELEPHONE (OUTPATIENT)
Dept: GASTROENTEROLOGY | Facility: CLINIC | Age: 58
End: 2018-06-04

## 2018-06-27 DIAGNOSIS — Z12.39 BREAST CANCER SCREENING: ICD-10-CM

## 2018-07-12 ENCOUNTER — PATIENT OUTREACH (OUTPATIENT)
Dept: ADMINISTRATIVE | Facility: HOSPITAL | Age: 58
End: 2018-07-12

## 2018-07-12 NOTE — LETTER
July 12, 2018    Osiris Dumont  [address]  [place] [address]             [facility]  [address]  [facility] [address]  Phone: [phone] Dear Mrs. Dumont:    [facility] to [facility] overall [facility].  To help you get the most out of each of your visits, we will review your information to make sure you are up to date on all of your recommended tests and/or procedures.      Dr. Mckeon has found that your chart shows you may be due for One time Hepatitis C screening lab test ( a viral condition that harms the liver), tetanus vaccine, mammogram, colon cancer screening, annual diabetic eye exam.    If you have had any of the above done at another facility, please bring the records or information with you so that your record at [facility] will be complete.  If you would like to schedule any of these, please contact me.    If you are currently taking medication, please bring it with you to your appointment for review.            If you have any questions or concerns, please don't hesitate to call.    Sincerely,        Eren Thao LPN Clinical Care Coordinator  [facility]  [address] [facility] [address]  [place] [address]  [phone] (p)   [phone] (f)

## 2018-09-13 ENCOUNTER — TELEPHONE (OUTPATIENT)
Dept: FAMILY MEDICINE | Facility: CLINIC | Age: 58
End: 2018-09-13

## 2018-09-13 NOTE — TELEPHONE ENCOUNTER
----- Message from Na Ha sent at 9/13/2018  4:31 PM CDT -----  Contact: pt            Name of Who is Calling: pt       What is the request in detail: pt is trying to schedule appt for a check up. Nothing available.      Can the clinic reply by [facility]: no      [facility] [phone]

## 2018-11-23 PROBLEM — Z75.8 DISCHARGE PLANNING ISSUES: Status: ACTIVE | Noted: 2018-11-23

## 2018-11-23 PROBLEM — K52.9 ACUTE GASTROENTERITIS: Status: ACTIVE | Noted: 2018-11-23

## 2018-12-07 DIAGNOSIS — E11.9 TYPE 2 DIABETES MELLITUS WITHOUT COMPLICATION: ICD-10-CM

## 2019-08-30 DIAGNOSIS — Z12.39 BREAST CANCER SCREENING: ICD-10-CM

## 2022-01-14 ENCOUNTER — TELEPHONE (OUTPATIENT)
Dept: FAMILY MEDICINE | Facility: CLINIC | Age: 62
End: 2022-01-14
Payer: MEDICARE

## 2022-01-14 NOTE — TELEPHONE ENCOUNTER
Called patient back and informed her that we are not taking any new medicaid patient at the present time

## 2022-01-14 NOTE — TELEPHONE ENCOUNTER
----- Message from Wes Mahmood sent at 1/14/2022 12:52 PM CST -----  Contact: pt  Type:  Sooner Apoointment Request    Caller is requesting a sooner appointment.  Caller declined first available appointment listed below.  Caller will not accept being placed on the waitlist and is requesting a message be sent to doctor.    Name of Caller:  pt  When is the first available appointment?  3/15  Symptoms:  exam  Best Call Back Number:  [phone] & [phone]    Additional Information:  first avail is 03/15, patient wanted to be seen within the next 2 weeks.  Please call her.       Primary Defect Width (In Cm): 2.2

## 2023-02-15 ENCOUNTER — TELEPHONE (OUTPATIENT)
Dept: PRIMARY CARE CLINIC | Facility: CLINIC | Age: 63
End: 2023-02-15
Payer: MEDICAID

## 2023-02-15 NOTE — TELEPHONE ENCOUNTER
----- Message from Kim Morales sent at 2/15/2023 10:50 AM CST -----  Contact: [phone]  Type:  Sooner Appointment Request    Caller is requesting a sooner appointment.  Caller declined first available appointment listed below.  Caller will not accept being placed on the waitlist and is requesting a message be sent to doctor.    Name of Caller:  pt  When is the first available appointment?  N/a  Symptoms:  new pt  Best Call Back Number:  [phone]  Additional Information:  pt is calling to schedule an appt as a new pt. Please call back and advise

## 2023-02-15 NOTE — TELEPHONE ENCOUNTER
Spoke with pt, explained that 65+ office is for pts 65 years old and older.   Pt verbalized understanding.    Will try to get her an appt with another PCP.  Pt verbalized understanding.

## 2024-02-12 PROBLEM — M17.11 PRIMARY OSTEOARTHRITIS OF RIGHT KNEE: Status: ACTIVE | Noted: 2024-02-12

## 2024-03-01 PROBLEM — D62 ACUTE BLOOD LOSS ANEMIA: Status: ACTIVE | Noted: 2024-03-01

## 2024-03-01 PROBLEM — Z71.89 ADVANCE CARE PLANNING: Status: ACTIVE | Noted: 2024-03-01

## 2024-03-01 PROBLEM — T81.31XA WOUND DEHISCENCE, SURGICAL: Status: ACTIVE | Noted: 2024-03-01

## 2024-03-01 PROBLEM — N18.32 STAGE 3B CHRONIC KIDNEY DISEASE: Status: ACTIVE | Noted: 2024-03-01

## 2025-08-25 ENCOUNTER — TELEPHONE (OUTPATIENT)
Dept: FAMILY MEDICINE | Facility: CLINIC | Age: 65
End: 2025-08-25
Payer: MEDICAID